# Patient Record
Sex: FEMALE | Race: WHITE | HISPANIC OR LATINO | Employment: FULL TIME | URBAN - METROPOLITAN AREA
[De-identification: names, ages, dates, MRNs, and addresses within clinical notes are randomized per-mention and may not be internally consistent; named-entity substitution may affect disease eponyms.]

---

## 2017-10-18 ENCOUNTER — OFFICE VISIT (OUTPATIENT)
Dept: MEDICAL GROUP | Facility: PHYSICIAN GROUP | Age: 32
End: 2017-10-18
Payer: COMMERCIAL

## 2017-10-18 VITALS
OXYGEN SATURATION: 95 % | HEART RATE: 74 BPM | HEIGHT: 67 IN | DIASTOLIC BLOOD PRESSURE: 78 MMHG | TEMPERATURE: 97.3 F | WEIGHT: 240 LBS | BODY MASS INDEX: 37.67 KG/M2 | SYSTOLIC BLOOD PRESSURE: 112 MMHG

## 2017-10-18 DIAGNOSIS — H61.22 IMPACTED CERUMEN OF LEFT EAR: ICD-10-CM

## 2017-10-18 PROCEDURE — 99213 OFFICE O/P EST LOW 20 MIN: CPT | Performed by: FAMILY MEDICINE

## 2017-10-18 ASSESSMENT — PAIN SCALES - GENERAL: PAINLEVEL: NO PAIN

## 2017-10-19 NOTE — ASSESSMENT & PLAN NOTE
Patient is here with complaint of decreased hearing in left ear.Reports having build up of wax every 2-3 years and having to have ear irrigation done.No pain, discharge, fever or chills.

## 2017-10-19 NOTE — PROGRESS NOTES
"Subjective:   Selena Yee is a 32 y.o. female here today for impacted cerumen left ear    Impacted cerumen of left ear  Patient is here with complaint of decreased hearing in left ear.Reports having build up of wax every 2-3 years and having to have ear irrigation done.No pain, discharge, fever or chills.       Current medicines (including changes today)  Current Outpatient Prescriptions   Medication Sig Dispense Refill   • ondansetron (ZOFRAN) 4 MG TABS tablet Take 1 Tab by mouth every 6 hours as needed for Nausea/Vomiting. 20 Tab 0   • drospirenone-ethinyl estradiol (MASSIMO) 3-0.03 MG per tablet TAKE 1 TABLET BY MOUTH EVERY DAY 84 Tab 3     No current facility-administered medications for this visit.      She  has a past medical history of Back pain (6/5/2009); Bilateral impacted cerumen (8/24/2015); DDD (degenerative disc disease), lumbar (6/5/2009); DUB (dysfunctional uterine bleeding) (6/5/2009); PCO (polycystic ovaries) (6/5/2009); and UTI (lower urinary tract infection) (8/24/2015).    ROS   No chest pain, no shortness of breath, no abdominal pain       Objective:     Blood pressure 112/78, pulse 74, temperature 36.3 °C (97.3 °F), height 1.702 m (5' 7\"), weight 108.9 kg (240 lb), SpO2 95 %. Body mass index is 37.59 kg/m².     PHYSICAL EXAM     GEN: Alert and oriented,well appearing, no acute distress  SKIN: warm, dry to touch, no rashes or lesions in visible areas  PSYCH: mood and affect normal, judgement normal  EYES: Conjunctiva clear, lids normal,pupils equal round and reactive  ENMT: Normal external nose and ears,Right EAC normal, clear, TM visible; Left EAC impacted with cerumen, TM not visible    Assessment and Plan:   The following treatment plan was discussed    1. Impacted cerumen of left ear  Ear irrigation performed by MA in the visit with copious amounts of cerumen removed.Patient tolerated procedure well.      Followup: Return if symptoms worsen or fail to improve.         Please note " that this dictation was created using voice recognition software. I have made every reasonable attempt to correct obvious errors, but I expect that there are errors of grammar and possibly content that I did not discover before finalizing the note.

## 2018-05-09 ENCOUNTER — OFFICE VISIT (OUTPATIENT)
Dept: MEDICAL GROUP | Facility: MEDICAL CENTER | Age: 33
End: 2018-05-09
Payer: COMMERCIAL

## 2018-05-09 VITALS
TEMPERATURE: 98.3 F | WEIGHT: 235 LBS | HEART RATE: 80 BPM | SYSTOLIC BLOOD PRESSURE: 110 MMHG | OXYGEN SATURATION: 96 % | RESPIRATION RATE: 16 BRPM | HEIGHT: 67 IN | DIASTOLIC BLOOD PRESSURE: 76 MMHG | BODY MASS INDEX: 36.88 KG/M2

## 2018-05-09 DIAGNOSIS — E66.9 OBESITY (BMI 30-39.9): ICD-10-CM

## 2018-05-09 DIAGNOSIS — H61.23 HEARING LOSS DUE TO CERUMEN IMPACTION, BILATERAL: ICD-10-CM

## 2018-05-09 PROCEDURE — 99213 OFFICE O/P EST LOW 20 MIN: CPT | Performed by: NURSE PRACTITIONER

## 2018-05-10 NOTE — PROGRESS NOTES
"Subjective:   Selena Yee is a 33 y.o. female here today for the following concern:     Hearing loss due to cerumen impaction, bilateral  Patient has been having increased left sided hearing loss and bilateral ear discomfort. Has a history of cerumen impaction and has not had her ears cleaned or looked at in quite some time. Denies fevers, chills, significant ear pain, or headache.        Current medicines (including changes today)  Current Outpatient Prescriptions   Medication Sig Dispense Refill   • ondansetron (ZOFRAN) 4 MG TABS tablet Take 1 Tab by mouth every 6 hours as needed for Nausea/Vomiting. 20 Tab 0   • drospirenone-ethinyl estradiol (MASSIMO) 3-0.03 MG per tablet TAKE 1 TABLET BY MOUTH EVERY DAY 84 Tab 3     No current facility-administered medications for this visit.      She  has a past medical history of Back pain (6/5/2009); Bilateral impacted cerumen (8/24/2015); DDD (degenerative disc disease), lumbar (6/5/2009); DUB (dysfunctional uterine bleeding) (6/5/2009); PCO (polycystic ovaries) (6/5/2009); and UTI (lower urinary tract infection) (8/24/2015).    ROS     No chest pain, no shortness of breath, no abdominal pain  Positive ROS as per HPI.  All other systems reviewed and are negative.     Objective:     Blood pressure 110/76, pulse 80, temperature 36.8 °C (98.3 °F), resp. rate 16, height 1.702 m (5' 7.01\"), weight 106.6 kg (235 lb), SpO2 96 %, not currently breastfeeding. Body mass index is 36.8 kg/m².     Physical Exam:  Constitutional: Alert, no distress.  Skin: Warm, dry, good turgor, no rashes in visible areas.  Eye: Equal, round, conjunctiva clear, lids normal.  ENMT: Lips without lesions, good dentition. +bilateral cerumen impaction, L>R.  Neck: Trachea midline  Respiratory: Unlabored respiratory effort  Cardiovascular: No edema.  Psych: Alert and oriented x3, normal affect and mood.        Assessment and Plan:   The following treatment plan was discussed    1. Hearing loss due " to cerumen impaction, bilateral  Unstable.   Bilateral ears lavaged by MA. Near complete removal of cerumen achieved. Advised avoiding qtips to clean ears. May use OTC wax softeners as needed to keep wax from building up. Patient verbalized understanding.       Followup: Return if symptoms worsen or fail to improve.    I have placed the below orders and discussed them with an approved delegating provider. The MA is performing the below orders under the direction of Dr. Christine

## 2018-05-10 NOTE — ASSESSMENT & PLAN NOTE
Patient has been having increased left sided hearing loss and bilateral ear discomfort. Has a history of cerumen impaction and has not had her ears cleaned or looked at in quite some time. Denies fevers, chills, significant ear pain, or headache.

## 2018-07-26 ENCOUNTER — OFFICE VISIT (OUTPATIENT)
Dept: MEDICAL GROUP | Facility: PHYSICIAN GROUP | Age: 33
End: 2018-07-26

## 2018-07-26 VITALS
HEART RATE: 67 BPM | TEMPERATURE: 98.9 F | OXYGEN SATURATION: 97 % | WEIGHT: 245 LBS | DIASTOLIC BLOOD PRESSURE: 60 MMHG | BODY MASS INDEX: 38.45 KG/M2 | HEIGHT: 67 IN | SYSTOLIC BLOOD PRESSURE: 110 MMHG

## 2018-07-26 DIAGNOSIS — F43.21 GRIEF REACTION: ICD-10-CM

## 2018-07-26 PROCEDURE — 99213 OFFICE O/P EST LOW 20 MIN: CPT | Performed by: NURSE PRACTITIONER

## 2018-07-26 ASSESSMENT — PATIENT HEALTH QUESTIONNAIRE - PHQ9
CLINICAL INTERPRETATION OF PHQ2 SCORE: 2
5. POOR APPETITE OR OVEREATING: 1 - SEVERAL DAYS
SUM OF ALL RESPONSES TO PHQ QUESTIONS 1-9: 7

## 2018-07-26 ASSESSMENT — ENCOUNTER SYMPTOMS
STRESS: 1
SHORTNESS OF BREATH: 0
DEPRESSION: 0

## 2018-07-26 NOTE — LETTER
July 26, 2018         Patient: Selena Yee   YOB: 1985   Date of Visit: 7/26/2018           To Whom it May Concern:    Selena Yee was seen in my clinic on 7/26/2018. Please allow her 2 dogs to reside with patient at current living facility as there are her  animals/    If you have any questions or concerns, please don't hesitate to call.        Sincerely,           ITALO Woodall.  Electronically Signed

## 2018-07-26 NOTE — PROGRESS NOTES
"Subjective:   Selena Yee is a 33 y.o. female here today for stress and requesting animal  letter.    Stress   Presents for initial visit. Onset was 6 to 12 months ago. The problem has been waxing and waning. Patient reports no chest pain, shortness of breath or suicidal ideas. Primary symptoms comment: Feeling down regarding infertility. States that her \"dogs are her children and needs a letter to allow them to live with her otherwise she will be evicted.\" . The severity of symptoms is mild.         Current medicines (including changes today)  Current Outpatient Prescriptions   Medication Sig Dispense Refill   • ondansetron (ZOFRAN) 4 MG TABS tablet Take 1 Tab by mouth every 6 hours as needed for Nausea/Vomiting. 20 Tab 0   • drospirenone-ethinyl estradiol (MASSIMO) 3-0.03 MG per tablet TAKE 1 TABLET BY MOUTH EVERY DAY 84 Tab 3     No current facility-administered medications for this visit.      She  has a past medical history of Back pain (6/5/2009); Bilateral impacted cerumen (8/24/2015); DDD (degenerative disc disease), lumbar (6/5/2009); DUB (dysfunctional uterine bleeding) (6/5/2009); PCO (polycystic ovaries) (6/5/2009); and UTI (lower urinary tract infection) (8/24/2015).    Social History     Social History   • Marital status: Single     Spouse name: N/A   • Number of children: N/A   • Years of education: N/A     Occupational History   • Not on file.     Social History Main Topics   • Smoking status: Never Smoker   • Smokeless tobacco: Never Used   • Alcohol use No      Comment: occ   • Drug use: No   • Sexual activity: Yes     Partners: Male     Other Topics Concern   • Not on file     Social History Narrative   • No narrative on file       Review of Systems   Respiratory: Negative for shortness of breath.    Cardiovascular: Negative for chest pain.   Psychiatric/Behavioral: Negative for depression and suicidal ideas.        Objective:     Blood pressure 110/60, pulse 67, temperature " "37.2 °C (98.9 °F), height 1.702 m (5' 7\"), weight 111.1 kg (245 lb), SpO2 97 %. Body mass index is 38.37 kg/m².     Physical Exam:  Constitutional: Oriented to person, place, and time and well-developed, well-nourished, and in no distress.  HENT:   Head: Normocephalic and atraumatic.   Eyes: Conjunctivae and EOM are normal. Pupils are equal, round, and reactive to light.   Neck: Normal range of motion. Neck supple. No thyromegaly present.   Cardiovascular: Normal rate, regular rhythm, normal heart sounds. Exam reveals no friction rub. No murmur heard.  Pulmonary/Chest: Effort normal and breath sounds normal. No respiratory distress or use of accessory muscles. No wheezes, rhonchi, or rales.   Musculoskeletal: Full range of motion.   Neurological: Alert and oriented to person, place, and time. Gait normal.   Skin: Skin is warm and dry. No cyanosis. No edema.  Psychiatric: Mood, memory, affect and judgment normal. She is tearful with discussing her current situation.      Assessment and Plan:   The following treatment plan was discussed    1. Grief reaction  Stable, controlled. Letter provided to allow dogs to reside at current living facility.  Return to clinic if her symptoms worsen including suicidal homicidal ideation.    Followup: Return if symptoms worsen or fail to improve.           "

## 2018-10-31 ENCOUNTER — OFFICE VISIT (OUTPATIENT)
Dept: URGENT CARE | Facility: CLINIC | Age: 33
End: 2018-10-31
Payer: COMMERCIAL

## 2018-10-31 VITALS
TEMPERATURE: 98.9 F | OXYGEN SATURATION: 98 % | DIASTOLIC BLOOD PRESSURE: 84 MMHG | HEART RATE: 72 BPM | SYSTOLIC BLOOD PRESSURE: 110 MMHG | HEIGHT: 67 IN | BODY MASS INDEX: 39.15 KG/M2 | WEIGHT: 249.4 LBS

## 2018-10-31 DIAGNOSIS — H61.23 BILATERAL IMPACTED CERUMEN: ICD-10-CM

## 2018-10-31 PROCEDURE — 99213 OFFICE O/P EST LOW 20 MIN: CPT | Performed by: PHYSICIAN ASSISTANT

## 2018-10-31 ASSESSMENT — ENCOUNTER SYMPTOMS
VOMITING: 0
CHILLS: 0
COUGH: 0
DIARRHEA: 0
DIZZINESS: 0
ABDOMINAL PAIN: 0
SORE THROAT: 0
MUSCULOSKELETAL NEGATIVE: 1
SPUTUM PRODUCTION: 0
SHORTNESS OF BREATH: 0
FEVER: 0
NAUSEA: 0

## 2018-11-01 NOTE — PROGRESS NOTES
"Subjective:      Selena Yee is a 33 y.o. female who presents with Cerumen Impaction (Right is worse than the left - ongoing issue)            Cerumen Impaction   This is a recurrent problem. The current episode started in the past 7 days. The problem occurs constantly. The problem has been gradually worsening. Pertinent negatives include no abdominal pain, chest pain, chills, congestion, coughing, fever, nausea, sore throat or vomiting. Nothing aggravates the symptoms. She has tried nothing for the symptoms.       Review of Systems   Constitutional: Negative for chills and fever.   HENT: Positive for hearing loss. Negative for congestion, ear discharge, ear pain, sore throat and tinnitus.    Respiratory: Negative for cough, sputum production and shortness of breath.    Cardiovascular: Negative for chest pain.   Gastrointestinal: Negative for abdominal pain, diarrhea, nausea and vomiting.   Genitourinary: Negative.    Musculoskeletal: Negative.    Neurological: Negative for dizziness.        Objective:     /84 (BP Location: Right arm, Patient Position: Sitting, BP Cuff Size: Adult long)   Pulse 72   Temp 37.2 °C (98.9 °F) (Temporal)   Ht 1.702 m (5' 7\")   Wt 113.1 kg (249 lb 6.4 oz)   SpO2 98%   BMI 39.06 kg/m²      Physical Exam   Constitutional: She is oriented to person, place, and time. She appears well-developed and well-nourished. No distress.   HENT:   Head: Normocephalic and atraumatic.   Right Ear: Hearing, tympanic membrane, external ear and ear canal normal.   Left Ear: Hearing, tympanic membrane, external ear and ear canal normal.   Mouth/Throat: Oropharynx is clear and moist.   Significant amount of cerumen present in bilateral ear canals, R>L. TMs visualized after successful ear lavage and appear normal.    Eyes: Pupils are equal, round, and reactive to light. Conjunctivae are normal.   Neck: Normal range of motion.   Cardiovascular: Normal rate.    Pulmonary/Chest: Effort " normal.   Musculoskeletal: Normal range of motion.   Neurological: She is alert and oriented to person, place, and time.   Skin: Skin is warm and dry. She is not diaphoretic.   Psychiatric: She has a normal mood and affect. Her behavior is normal.   Nursing note and vitals reviewed.          PMH:  has a past medical history of Back pain (6/5/2009); Bilateral impacted cerumen (8/24/2015); DDD (degenerative disc disease), lumbar (6/5/2009); DUB (dysfunctional uterine bleeding) (6/5/2009); PCO (polycystic ovaries) (6/5/2009); and UTI (lower urinary tract infection) (8/24/2015).  MEDS:   Current Outpatient Prescriptions:   •  ondansetron (ZOFRAN) 4 MG TABS tablet, Take 1 Tab by mouth every 6 hours as needed for Nausea/Vomiting., Disp: 20 Tab, Rfl: 0  •  drospirenone-ethinyl estradiol (MASSIMO) 3-0.03 MG per tablet, TAKE 1 TABLET BY MOUTH EVERY DAY (Patient not taking: Reported on 10/31/2018), Disp: 84 Tab, Rfl: 3  ALLERGIES: No Known Allergies  SURGHX: History reviewed. No pertinent surgical history.  SOCHX:  reports that she has never smoked. She has never used smokeless tobacco. She reports that she does not drink alcohol or use drugs.  FH: family history includes Diabetes in her mother; Hypertension in her father.    Assessment/Plan:     1. Bilateral impacted cerumen    Ear lavage performed bilaterally with significant amount of cerumen removed from both ear canals. TMs visualized afterwards and appear normal. Patient reports resolution of hearing loss after cerumen removal. Advised patient to soak equal parts water and hydrogen peroxide for 5-10 minutes every 1-2 weeks for prevention of wax buildup in the future.

## 2019-04-20 ENCOUNTER — OFFICE VISIT (OUTPATIENT)
Dept: URGENT CARE | Facility: PHYSICIAN GROUP | Age: 34
End: 2019-04-20
Payer: COMMERCIAL

## 2019-04-20 VITALS
OXYGEN SATURATION: 97 % | BODY MASS INDEX: 36.1 KG/M2 | HEART RATE: 94 BPM | DIASTOLIC BLOOD PRESSURE: 84 MMHG | TEMPERATURE: 98.1 F | HEIGHT: 67 IN | SYSTOLIC BLOOD PRESSURE: 146 MMHG | WEIGHT: 230 LBS

## 2019-04-20 DIAGNOSIS — J02.9 SORE THROAT: ICD-10-CM

## 2019-04-20 DIAGNOSIS — J02.0 STREP THROAT: ICD-10-CM

## 2019-04-20 LAB
INT CON NEG: NEGATIVE
INT CON POS: POSITIVE
S PYO AG THROAT QL: POSITIVE

## 2019-04-20 PROCEDURE — 99214 OFFICE O/P EST MOD 30 MIN: CPT | Performed by: NURSE PRACTITIONER

## 2019-04-20 PROCEDURE — 87880 STREP A ASSAY W/OPTIC: CPT | Performed by: NURSE PRACTITIONER

## 2019-04-20 RX ORDER — GUAIFENESIN 600 MG/1
600 TABLET, EXTENDED RELEASE ORAL EVERY 12 HOURS
COMMUNITY
End: 2021-07-15

## 2019-04-20 RX ORDER — AMOXICILLIN 875 MG/1
875 TABLET, COATED ORAL 2 TIMES DAILY
Qty: 20 TAB | Refills: 0 | Status: SHIPPED | OUTPATIENT
Start: 2019-04-20 | End: 2021-08-05

## 2019-04-20 ASSESSMENT — ENCOUNTER SYMPTOMS
FEVER: 0
MYALGIAS: 0
CHILLS: 0
DIARRHEA: 0
SHORTNESS OF BREATH: 0
WHEEZING: 0
COUGH: 1
HEADACHES: 1
ORTHOPNEA: 0
SORE THROAT: 1
EYE DISCHARGE: 0
SPUTUM PRODUCTION: 0
NAUSEA: 0

## 2019-04-20 NOTE — PROGRESS NOTES
Subjective:      Selena Yee is a 34 y.o. female who presents with Pharyngitis (x5 days. Sore throat, headache, ear pain.)            HPI new. 34 year old female with congestion, sore throat, body aches and ear pain as well as headache for 5 days. She denies any fever, chills, nausea or diarrhea. She has no rashes or eye symptoms with this illness. Taking otc cough and cold medications. No flu shot this year.  Patient has no known allergies.  Current Outpatient Prescriptions on File Prior to Visit   Medication Sig Dispense Refill   • ondansetron (ZOFRAN) 4 MG TABS tablet Take 1 Tab by mouth every 6 hours as needed for Nausea/Vomiting. (Patient not taking: Reported on 4/20/2019) 20 Tab 0   • drospirenone-ethinyl estradiol (MASSIMO) 3-0.03 MG per tablet TAKE 1 TABLET BY MOUTH EVERY DAY (Patient not taking: Reported on 10/31/2018) 84 Tab 3     No current facility-administered medications on file prior to visit.      Social History     Social History   • Marital status: Single     Spouse name: N/A   • Number of children: N/A   • Years of education: N/A     Occupational History   • Not on file.     Social History Main Topics   • Smoking status: Never Smoker   • Smokeless tobacco: Never Used   • Alcohol use No   • Drug use: No   • Sexual activity: Yes     Partners: Male     Other Topics Concern   • Not on file     Social History Narrative   • No narrative on file     family history includes Diabetes in her mother; Hypertension in her father.      Review of Systems   Constitutional: Positive for malaise/fatigue. Negative for chills and fever.   HENT: Positive for congestion and sore throat.    Eyes: Negative for discharge.   Respiratory: Positive for cough. Negative for sputum production, shortness of breath and wheezing.    Cardiovascular: Negative for chest pain and orthopnea.   Gastrointestinal: Negative for diarrhea and nausea.   Musculoskeletal: Negative for myalgias.   Neurological: Positive for headaches.  "  Endo/Heme/Allergies: Negative for environmental allergies.          Objective:     /84   Pulse 94   Temp 36.7 °C (98.1 °F)   Ht 1.702 m (5' 7\")   Wt 104.3 kg (230 lb)   SpO2 97%   BMI 36.02 kg/m²      Physical Exam   Constitutional: She is oriented to person, place, and time. She appears well-developed and well-nourished. No distress.   HENT:   Head: Normocephalic and atraumatic.   Right Ear: External ear and ear canal normal. Tympanic membrane is not injected and not perforated. No middle ear effusion.   Left Ear: External ear and ear canal normal. Tympanic membrane is not injected and not perforated.  No middle ear effusion.   Nose: Mucosal edema present.   Mouth/Throat: Posterior oropharyngeal erythema present. No oropharyngeal exudate.   Eyes: Conjunctivae are normal. Right eye exhibits no discharge. Left eye exhibits no discharge.   Neck: Normal range of motion. Neck supple.   Cardiovascular: Normal rate, regular rhythm and normal heart sounds.    No murmur heard.  Pulmonary/Chest: Effort normal and breath sounds normal. No respiratory distress.   Musculoskeletal: Normal range of motion.   Normal movement of all 4 extremities.   Lymphadenopathy:     She has no cervical adenopathy.        Right: No supraclavicular adenopathy present.        Left: No supraclavicular adenopathy present.   Neurological: She is alert and oriented to person, place, and time. Gait normal.   Skin: Skin is warm and dry.   Psychiatric: She has a normal mood and affect. Her behavior is normal. Thought content normal.   Nursing note and vitals reviewed.              Assessment/Plan:     1. Sore throat  POCT Rapid Strep A   2. Strep throat  amoxicillin (AMOXIL) 875 MG tablet     Positive strep.  Amoxicillin/change toothbrush in 48 hours.  May continue OTC medications as directed.  Differential diagnosis, natural history, supportive care, and indications for immediate follow-up discussed at length.     "

## 2019-12-30 ENCOUNTER — OFFICE VISIT (OUTPATIENT)
Dept: URGENT CARE | Facility: PHYSICIAN GROUP | Age: 34
End: 2019-12-30
Payer: COMMERCIAL

## 2019-12-30 ENCOUNTER — HOSPITAL ENCOUNTER (OUTPATIENT)
Dept: RADIOLOGY | Facility: MEDICAL CENTER | Age: 34
End: 2019-12-30
Attending: FAMILY MEDICINE
Payer: COMMERCIAL

## 2019-12-30 VITALS
TEMPERATURE: 97.9 F | WEIGHT: 261 LBS | BODY MASS INDEX: 40.97 KG/M2 | OXYGEN SATURATION: 98 % | HEIGHT: 67 IN | SYSTOLIC BLOOD PRESSURE: 112 MMHG | RESPIRATION RATE: 16 BRPM | DIASTOLIC BLOOD PRESSURE: 84 MMHG | HEART RATE: 67 BPM

## 2019-12-30 DIAGNOSIS — R10.84 GENERALIZED ABDOMINAL PAIN: ICD-10-CM

## 2019-12-30 PROCEDURE — 99213 OFFICE O/P EST LOW 20 MIN: CPT | Performed by: FAMILY MEDICINE

## 2019-12-31 ASSESSMENT — ENCOUNTER SYMPTOMS
VOMITING: 0
CONSTIPATION: 0
DIARRHEA: 0
NAUSEA: 0

## 2020-01-01 NOTE — PROGRESS NOTES
"Subjective:   Selena Yee is a 34 y.o. female who presents for RLQ Pain (exacerbates with ETOH )     RLQ Pain   This is a new problem. The current episode started 1 to 4 weeks ago. The onset quality is undetermined. The problem occurs constantly. The problem has been waxing and waning. The pain is located in the right flank. The pain is moderate. The quality of the pain is aching, cramping and tearing. Pertinent negatives include no constipation, diarrhea, nausea or vomiting.     Review of Systems   Gastrointestinal: Negative for constipation, diarrhea, nausea and vomiting.      Objective:   /84 (BP Location: Left arm, Patient Position: Sitting, BP Cuff Size: Adult)   Pulse 67   Temp 36.6 °C (97.9 °F) (Temporal)   Resp 16   Ht 1.702 m (5' 7\")   Wt 118.4 kg (261 lb)   SpO2 98%   BMI 40.88 kg/m²   Physical Exam  Constitutional:       General: She is not in acute distress.     Appearance: She is well-developed.   HENT:      Head: Normocephalic and atraumatic.   Eyes:      Conjunctiva/sclera: Conjunctivae normal.      Pupils: Pupils are equal, round, and reactive to light.   Cardiovascular:      Rate and Rhythm: Normal rate and regular rhythm.      Heart sounds: No murmur.   Pulmonary:      Effort: Pulmonary effort is normal. No respiratory distress.      Breath sounds: Normal breath sounds.   Abdominal:      General: There is no distension.      Palpations: Abdomen is soft.      Tenderness: There is no tenderness. There is no right CVA tenderness, left CVA tenderness, guarding or rebound. Negative signs include Kessler's sign, Rovsing's sign, McBurney's sign, psoas sign and obturator sign.       Skin:     General: Skin is warm and dry.   Neurological:      Mental Status: She is alert and oriented to person, place, and time.      Sensory: No sensory deficit.      Deep Tendon Reflexes: Reflexes are normal and symmetric.          Assessment/Plan:   1. Generalized abdominal pain  - [id number] " VIEW; Future  - POCT Pregnancy  Likely muscle strain 2/2 retching. Use over-the-counter pain reliever, such as acetaminophen (Tylenol), ibuprofen (Advil, Motrin) or naproxen (Aleve) as needed; follow package directions for dosing.   Differential diagnosis, natural history, supportive care, and indications for immediate follow-up discussed.

## 2021-07-13 ENCOUNTER — TELEPHONE (OUTPATIENT)
Dept: MEDICAL GROUP | Facility: PHYSICIAN GROUP | Age: 36
End: 2021-07-13

## 2021-07-13 NOTE — TELEPHONE ENCOUNTER
Future Appointments       Provider Department Center    7/15/2021 2:30 PM BROOKE Valiente [facility] VIS        NEW PATIENT VISIT PRE-VISIT PLANNING    1.  [employer] Patient is checked in Patient Demographics?Yes    2.  Immunizations were updated in Epic using Reconcile Outside Information activity? Yes         3.  Is this appointment scheduled as a Hospital Follow-Up? No    4.  Patient is due for the following Health Maintenance Topics:   Health Maintenance Due   Topic Date Due   • IMM DTaP/Tdap/Td Vaccine (6 - Tdap) 03/20/1998   • PAP SMEAR  06/02/2017   • COVID-19 Vaccine (2 - Moderna 2-dose series) 06/02/2021     5.  Reviewed/Updated the following with patient:       •   Preferred Pharmacy? Yes       •   Preferred Lab? Yes       •   Preferred Communication? Yes       •   Allergies? Yes       •   Medications? YES. Was Abstract Encounter opened and chart updated? YES       •   Social History? Yes       •   Family History (document living status of immediate family members and if + hx of  cancer, diabetes, hypertension, hyperlipidemia, heart attack, stroke) Yes    6.  Updated Care Team?       •   DME Company (gait device, O2, CPAP, etc.) NO       •   Other Specialists (eye doctor, derm, GYN, cardiology, endo, etc): NO    7.  AHA (Puls8) form printed for Provider? N/A   Patient advised to arrive 15 minutes prior to scheduled appointment

## 2021-07-15 ENCOUNTER — OFFICE VISIT (OUTPATIENT)
Dept: MEDICAL GROUP | Facility: PHYSICIAN GROUP | Age: 36
End: 2021-07-15
Payer: COMMERCIAL

## 2021-07-15 VITALS
WEIGHT: 269 LBS | SYSTOLIC BLOOD PRESSURE: 110 MMHG | RESPIRATION RATE: 16 BRPM | HEIGHT: 67 IN | DIASTOLIC BLOOD PRESSURE: 70 MMHG | OXYGEN SATURATION: 96 % | BODY MASS INDEX: 42.22 KG/M2 | TEMPERATURE: 98.2 F | HEART RATE: 80 BPM

## 2021-07-15 DIAGNOSIS — N93.8 DUB (DYSFUNCTIONAL UTERINE BLEEDING): ICD-10-CM

## 2021-07-15 DIAGNOSIS — M51.36 DDD (DEGENERATIVE DISC DISEASE), LUMBAR: ICD-10-CM

## 2021-07-15 DIAGNOSIS — E28.2 POLYCYSTIC OVARIES: ICD-10-CM

## 2021-07-15 DIAGNOSIS — E66.01 MORBID OBESITY (HCC): ICD-10-CM

## 2021-07-15 PROBLEM — H61.23 HEARING LOSS DUE TO CERUMEN IMPACTION, BILATERAL: Status: RESOLVED | Noted: 2018-05-09 | Resolved: 2021-07-15

## 2021-07-15 PROBLEM — F43.21 SITUATIONAL DEPRESSION: Status: RESOLVED | Noted: 2018-07-26 | Resolved: 2021-07-15

## 2021-07-15 PROCEDURE — 99214 OFFICE O/P EST MOD 30 MIN: CPT | Performed by: NURSE PRACTITIONER

## 2021-07-15 ASSESSMENT — PATIENT HEALTH QUESTIONNAIRE - PHQ9: CLINICAL INTERPRETATION OF PHQ2 SCORE: 0

## 2021-07-15 NOTE — ASSESSMENT & PLAN NOTE
BMI at 42.13.  Has not been exercising.  Recently started walking 30 minutes daily.  Walking dogs.

## 2021-07-15 NOTE — PROGRESS NOTES
Selena Yee is a 36 y.o. female here today to establish care and for evaluation and management of:    HPI  Morbid obesity (HCC)  BMI at 42.13.  Has not been exercising.  Recently started walking 30 minutes daily.  Walking dogs.     DDD (Degenerative Disc Disease), Lumbar  2009 MRI  MULTILEVEL DEGENERATIVE DISC DISEASE AND FACET DEGENERATION WHICH RESULTS   IN NO SIGNIFICANT CENTRAL CANAL NARROWING.  THERE IS MINIMAL NEURAL   FORAMINAL NARROWING AT LEVELS AS SPECIFICALLY DESCRIBED ABOVE.      DUB (dysfunctional uterine bleeding)  Reports past history of irregular periods, was on oral birth control pills that regulated periods.  Started 2-3 years ago, went to OB and then to fertility clinic and had shots, clomid no pregnancy.  Then reports that her periods are about three times/year.  Has noted in the past 3 months is having some ongoing spotting, occasional cramping. Wearing panty liners.  No heavy bleeding reported.     PCO (polycystic ovaries)  Has done one round of fertility treatments, buthaving irregular periods and would like to have a pregnancy. Reports hair on nipples and some on face.  This seems to be worsening.        Current medicines (including changes today)  Current Outpatient Medications   Medication Sig Dispense Refill   • amoxicillin (AMOXIL) 875 MG tablet Take 1 Tab by mouth 2 times a day. (Patient not taking: Reported on 12/30/2019) 20 Tab 0     No current facility-administered medications for this visit.       She  has a past medical history of Anemia, Back pain (6/5/2009), Bilateral impacted cerumen (8/24/2015), DDD (degenerative disc disease), lumbar (6/5/2009), DUB (dysfunctional uterine bleeding) (6/5/2009), PCO (polycystic ovaries) (6/5/2009), and UTI (lower urinary tract infection) (8/24/2015).    She  has no past surgical history on file.    Social History     Tobacco Use   • Smoking status: Never Smoker   • Smokeless tobacco: Never Used   Vaping Use   • Vaping Use: Never used  "  Substance Use Topics   • Alcohol use: Yes     Comment: once every other week 3-4 cocktails   • Drug use: No       Social History     Social History Narrative   • Not on file       Family History   Problem Relation Age of Onset   • Hypertension Father    • Diabetes Father    • Diabetes Mother        Family Status   Relation Name Status   • Fa  Alive   • Mo  Alive   • Sis  Alive   • Bro  Alive   • MGMo  Alive   • MGFa  Alive   • PGMo     • PGFa           ROS  As stated in hpi  All other systems reviewed and are negative     Objective:     /70 (BP Location: Left arm, Patient Position: Sitting)   Pulse 80   Temp 36.8 °C (98.2 °F) (Temporal)   Resp 16   Ht 1.702 m (5' 7\")   Wt 122 kg (269 lb)   SpO2 96%  Body mass index is 42.13 kg/m².  Physical Exam:    Constitutional: Alert, no distress.  Skin: Warm, dry, good turgor, no rashes in visible areas.  Eye: Equal, round and reactive, conjunctiva clear, lids normal..  Neck: Trachea midline, no masses, no thyromegaly. No cervical or supraclavicular lymphadenopathy.  Respiratory: Unlabored respiratory effort,  Psych: Alert and oriented x3, normal affect and mood.        Assessment and Plan:   The following treatment plan was discussed    1. Morbid obesity (HCC)  Chronic, ongoing.  Discussed exercise goal 150 min/week.  Encouraged portion control.  Monitor and follow.     2. DDD (degenerative disc disease), lumbar  Chronic, historical, stable at this time.  See #1 as a contributing factor.  Encouraging daily exercise.  Monitor.     3. DUB (dysfunctional uterine bleeding)  Chronic, ongoing, worsening.  High suspicion for PCOS.  Will order labs.  Possible referral to GYN.  May start metformin and spiranolactone.  Monitor and follow. Return in 3-4 weeks for weel woman.   - CBC WITH DIFFERENTIAL; Future  - Comp Metabolic Panel; Future  - HEMOGLOBIN A1C; Future  - Lipid Profile; Future  - TSH; Future  - FREE THYROXINE; Future  - VITAMIN D,25 HYDROXY; " Future  - ESTRADIOL; Future  - FSH/LH; Future  - TESTOSTERONE F&T FEMALES/CHILD; Future    4. PCO (polycystic ovaries)  See #3  High suspicioun due to androgen type symptoms.  Will follow labs.  Possible referral to GYN  Monitor and follow.       Records requested.  Followup: Return in about 4 weeks (around 8/12/2021) for well woman, Labs.         Educated in proper administration of medication(s) ordered today including safety, possible SE, risks, benefits, rationale and alternatives to therapy.     Please note that this dictation was created using voice recognition software. I have made every reasonable attempt to correct obvious errors, but I expect that there are errors of grammar and possibly content that I did not discover before finalizing the note.

## 2021-07-15 NOTE — ASSESSMENT & PLAN NOTE
2009 MRI  MULTILEVEL DEGENERATIVE DISC DISEASE AND FACET DEGENERATION WHICH RESULTS   IN NO SIGNIFICANT CENTRAL CANAL NARROWING.  THERE IS MINIMAL NEURAL   FORAMINAL NARROWING AT LEVELS AS SPECIFICALLY DESCRIBED ABOVE.

## 2021-07-15 NOTE — ASSESSMENT & PLAN NOTE
Has done one round of fertility treatments, buthaving irregular periods and would like to have a pregnancy. Reports hair on nipples and some on face.  This seems to be worsening.

## 2021-07-15 NOTE — ASSESSMENT & PLAN NOTE
Reports past history of irregular periods, was on oral birth control pills that regulated periods.  Started 2-3 years ago, went to OB and then to fertility clinic and had shots, clomid no pregnancy.  Then reports that her periods are about three times/year.  Has noted in the past 3 months is having some ongoing spotting, occasional cramping. Wearing panty liners.  No heavy bleeding reported.

## 2021-07-17 ENCOUNTER — HOSPITAL ENCOUNTER (OUTPATIENT)
Dept: LAB | Facility: MEDICAL CENTER | Age: 36
End: 2021-07-17
Attending: NURSE PRACTITIONER
Payer: COMMERCIAL

## 2021-07-17 DIAGNOSIS — N93.8 DUB (DYSFUNCTIONAL UTERINE BLEEDING): ICD-10-CM

## 2021-07-17 LAB
25(OH)D3 SERPL-MCNC: 20 NG/ML (ref 30–100)
ALBUMIN SERPL BCP-MCNC: 4.3 G/DL (ref 3.2–4.9)
ALBUMIN/GLOB SERPL: 1.5 G/DL
ALP SERPL-CCNC: 86 U/L (ref 30–99)
ALT SERPL-CCNC: 45 U/L (ref 2–50)
ANION GAP SERPL CALC-SCNC: 13 MMOL/L (ref 7–16)
AST SERPL-CCNC: 29 U/L (ref 12–45)
BASOPHILS # BLD AUTO: 0.7 % (ref 0–1.8)
BASOPHILS # BLD: 0.06 K/UL (ref 0–0.12)
BILIRUB SERPL-MCNC: 0.4 MG/DL (ref 0.1–1.5)
BUN SERPL-MCNC: 12 MG/DL (ref 8–22)
CALCIUM SERPL-MCNC: 8.9 MG/DL (ref 8.5–10.5)
CHLORIDE SERPL-SCNC: 104 MMOL/L (ref 96–112)
CHOLEST SERPL-MCNC: 186 MG/DL (ref 100–199)
CO2 SERPL-SCNC: 23 MMOL/L (ref 20–33)
CREAT SERPL-MCNC: 0.72 MG/DL (ref 0.5–1.4)
EOSINOPHIL # BLD AUTO: 0.1 K/UL (ref 0–0.51)
EOSINOPHIL NFR BLD: 1.1 % (ref 0–6.9)
ERYTHROCYTE [DISTWIDTH] IN BLOOD BY AUTOMATED COUNT: 41.5 FL (ref 35.9–50)
EST. AVERAGE GLUCOSE BLD GHB EST-MCNC: 114 MG/DL
FASTING STATUS PATIENT QL REPORTED: NORMAL
FSH SERPL-ACNC: 3.1 MIU/ML
GLOBULIN SER CALC-MCNC: 2.8 G/DL (ref 1.9–3.5)
GLUCOSE SERPL-MCNC: 86 MG/DL (ref 65–99)
HBA1C MFR BLD: 5.6 % (ref 4–5.6)
HCT VFR BLD AUTO: 42.5 % (ref 37–47)
HDLC SERPL-MCNC: 32 MG/DL
HGB BLD-MCNC: 14.1 G/DL (ref 12–16)
IMM GRANULOCYTES # BLD AUTO: 0.03 K/UL (ref 0–0.11)
IMM GRANULOCYTES NFR BLD AUTO: 0.3 % (ref 0–0.9)
LDLC SERPL CALC-MCNC: 120 MG/DL
LH SERPL-ACNC: 4.6 IU/L
LYMPHOCYTES # BLD AUTO: 2.61 K/UL (ref 1–4.8)
LYMPHOCYTES NFR BLD: 29.9 % (ref 22–41)
MCH RBC QN AUTO: 29.5 PG (ref 27–33)
MCHC RBC AUTO-ENTMCNC: 33.2 G/DL (ref 33.6–35)
MCV RBC AUTO: 88.9 FL (ref 81.4–97.8)
MONOCYTES # BLD AUTO: 0.42 K/UL (ref 0–0.85)
MONOCYTES NFR BLD AUTO: 4.8 % (ref 0–13.4)
NEUTROPHILS # BLD AUTO: 5.5 K/UL (ref 2–7.15)
NEUTROPHILS NFR BLD: 63.2 % (ref 44–72)
NRBC # BLD AUTO: 0 K/UL
NRBC BLD-RTO: 0 /100 WBC
PLATELET # BLD AUTO: 282 K/UL (ref 164–446)
PMV BLD AUTO: 10.9 FL (ref 9–12.9)
POTASSIUM SERPL-SCNC: 4.3 MMOL/L (ref 3.6–5.5)
PROT SERPL-MCNC: 7.1 G/DL (ref 6–8.2)
RBC # BLD AUTO: 4.78 M/UL (ref 4.2–5.4)
SODIUM SERPL-SCNC: 140 MMOL/L (ref 135–145)
T4 FREE SERPL-MCNC: 1.29 NG/DL (ref 0.93–1.7)
TRIGL SERPL-MCNC: 172 MG/DL (ref 0–149)
TSH SERPL DL<=0.005 MIU/L-ACNC: 2 UIU/ML (ref 0.38–5.33)
WBC # BLD AUTO: 8.7 K/UL (ref 4.8–10.8)

## 2021-07-17 PROCEDURE — 80061 LIPID PANEL: CPT

## 2021-07-17 PROCEDURE — 83036 HEMOGLOBIN GLYCOSYLATED A1C: CPT

## 2021-07-17 PROCEDURE — 83001 ASSAY OF GONADOTROPIN (FSH): CPT

## 2021-07-17 PROCEDURE — 84443 ASSAY THYROID STIM HORMONE: CPT

## 2021-07-17 PROCEDURE — 36415 COLL VENOUS BLD VENIPUNCTURE: CPT

## 2021-07-17 PROCEDURE — 84402 ASSAY OF FREE TESTOSTERONE: CPT

## 2021-07-17 PROCEDURE — 80053 COMPREHEN METABOLIC PANEL: CPT

## 2021-07-17 PROCEDURE — 82306 VITAMIN D 25 HYDROXY: CPT

## 2021-07-17 PROCEDURE — 85025 COMPLETE CBC W/AUTO DIFF WBC: CPT

## 2021-07-17 PROCEDURE — 83002 ASSAY OF GONADOTROPIN (LH): CPT

## 2021-07-17 PROCEDURE — 82670 ASSAY OF TOTAL ESTRADIOL: CPT

## 2021-07-17 PROCEDURE — 84403 ASSAY OF TOTAL TESTOSTERONE: CPT

## 2021-07-17 PROCEDURE — 84439 ASSAY OF FREE THYROXINE: CPT

## 2021-07-17 PROCEDURE — 84270 ASSAY OF SEX HORMONE GLOBUL: CPT

## 2021-07-19 LAB — ESTRADIOL SERPL-MCNC: 59 PG/ML

## 2021-07-21 LAB
SHBG SERPL-SCNC: 24 NMOL/L (ref 30–135)
TESTOST FREE SERPL-MCNC: 11.2 PG/ML (ref 1.3–9.2)
TESTOST SERPL-MCNC: 57 NG/DL (ref 9–55)

## 2021-08-05 ENCOUNTER — OFFICE VISIT (OUTPATIENT)
Dept: MEDICAL GROUP | Facility: PHYSICIAN GROUP | Age: 36
End: 2021-08-05
Payer: COMMERCIAL

## 2021-08-05 ENCOUNTER — HOSPITAL ENCOUNTER (OUTPATIENT)
Facility: MEDICAL CENTER | Age: 36
End: 2021-08-05
Attending: NURSE PRACTITIONER
Payer: COMMERCIAL

## 2021-08-05 VITALS
DIASTOLIC BLOOD PRESSURE: 80 MMHG | BODY MASS INDEX: 43 KG/M2 | TEMPERATURE: 97.7 F | OXYGEN SATURATION: 97 % | HEART RATE: 80 BPM | HEIGHT: 67 IN | SYSTOLIC BLOOD PRESSURE: 112 MMHG | WEIGHT: 274 LBS | RESPIRATION RATE: 16 BRPM

## 2021-08-05 DIAGNOSIS — Z01.419 WELL WOMAN EXAM WITH ROUTINE GYNECOLOGICAL EXAM: ICD-10-CM

## 2021-08-05 DIAGNOSIS — E28.2 POLYCYSTIC OVARIES: ICD-10-CM

## 2021-08-05 DIAGNOSIS — E78.2 MIXED HYPERLIPIDEMIA: ICD-10-CM

## 2021-08-05 DIAGNOSIS — Z12.4 SCREENING FOR CERVICAL CANCER: ICD-10-CM

## 2021-08-05 DIAGNOSIS — E55.9 VITAMIN D DEFICIENCY: ICD-10-CM

## 2021-08-05 PROBLEM — E78.1 PURE HYPERTRIGLYCERIDEMIA: Status: ACTIVE | Noted: 2021-08-05

## 2021-08-05 PROCEDURE — 87591 N.GONORRHOEAE DNA AMP PROB: CPT

## 2021-08-05 PROCEDURE — 88175 CYTOPATH C/V AUTO FLUID REDO: CPT

## 2021-08-05 PROCEDURE — 87624 HPV HI-RISK TYP POOLED RSLT: CPT

## 2021-08-05 PROCEDURE — 87491 CHLMYD TRACH DNA AMP PROBE: CPT

## 2021-08-05 PROCEDURE — 99395 PREV VISIT EST AGE 18-39: CPT | Performed by: NURSE PRACTITIONER

## 2021-08-05 PROCEDURE — 99000 SPECIMEN HANDLING OFFICE-LAB: CPT | Performed by: NURSE PRACTITIONER

## 2021-08-05 ASSESSMENT — FIBROSIS 4 INDEX: FIB4 SCORE: 0.52

## 2021-08-05 NOTE — ASSESSMENT & PLAN NOTE
Results for LOU TOMAS (MRN [id number]) as of 8/5/2021 15:06   Ref. Range 7/17/2021 08:33   Triglycerides Latest Ref Range: 0 - 149 mg/dL 172 (H)   HDL Latest Ref Range: >=40 mg/dL 32 (A)   LDL Latest Ref Range: <100 mg/dL 120 (H)

## 2021-08-05 NOTE — PROGRESS NOTES
Subjective:     CC:   Chief Complaint   Patient presents with   • Gynecologic Exam   • Lab Results       HPI:   Selena Yee is a 36 y.o. female who presents for annual exam    Patient has GYN provider: No   Last Pap Smear: due today   H/O Abnormal Pap: No  Last Mammogram: na  Last Bone Density Test: na  Last Colorectal Cancer Screening: na  Last Tdap: na  Received HPV series: No    Exercise: sporadic irregular exercise, <half hour walking weekly  Diet: BMI 42.9      No LMP recorded. (Menstrual status: Irregular Menses).  Hx STDs: No  Birth control:none  Mensesirregular,   Denies cramping.  No significant bloating/fluid retention, pelvic pain, or dyspareunia. No abnormal vaginal discharge.  No breast tenderness, mass, nipple discharge, changes in size or contour, or abnormal cyclic discomfort.    OB History    Para Term  AB Living   0 0 0 0 0 0   SAB TAB Ectopic Molar Multiple Live Births   0 0 0 0 0 0      She  reports being sexually active and has had partner(s) who are Male.    She  has a past medical history of Anemia, Back pain (2009), Bilateral impacted cerumen (2015), DDD (degenerative disc disease), lumbar (2009), DUB (dysfunctional uterine bleeding) (2009), PCO (polycystic ovaries) (2009), and UTI (lower urinary tract infection) (2015).  She  has no past surgical history on file.    Family History   Problem Relation Age of Onset   • Hypertension Father    • Diabetes Father    • Diabetes Mother      Social History     Tobacco Use   • Smoking status: Never Smoker   • Smokeless tobacco: Never Used   Vaping Use   • Vaping Use: Never used   Substance Use Topics   • Alcohol use: Yes     Comment: once every other week 3-4 cocktails   • Drug use: No       Patient Active Problem List    Diagnosis Date Noted   • Well woman exam with routine gynecological exam 2021   • Vitamin D deficiency 2021   • Mixed hyperlipidemia 2021   • Morbid obesity (HCC)  "05/09/2018   • DDD (degenerative disc disease), lumbar 06/05/2009   • PCO (polycystic ovaries) 06/05/2009   • DUB (dysfunctional uterine bleeding) 06/05/2009     No current outpatient medications on file.     No current facility-administered medications for this visit.     No Known Allergies    Review of Systems   Constitutional: Negative for fever, chills and malaise/fatigue.   HENT: Negative for congestion.    Eyes: Negative for pain.   Respiratory: Negative for cough and shortness of breath.    Cardiovascular: Negative for chest pain and leg swelling.   Gastrointestinal: Negative for nausea, vomiting, abdominal pain and diarrhea.   Genitourinary: Negative for dysuria and hematuria.   Skin: Negative for rash.   Neurological: Negative for dizziness, focal weakness and headaches.   Endo/Heme/Allergies: Does not bruise/bleed easily.   Psychiatric/Behavioral: Negative for depression.  The patient is not nervous/anxious.      Objective:   /80 (BP Location: Left arm, Patient Position: Sitting)   Pulse 80   Temp 36.5 °C (97.7 °F) (Temporal)   Resp 16   Ht 1.702 m (5' 7\")   Wt 124 kg (274 lb)   SpO2 97%   BMI 42.91 kg/m²     Wt Readings from Last 4 Encounters:   08/05/21 124 kg (274 lb)   07/15/21 122 kg (269 lb)   12/30/19 118 kg (261 lb)   04/20/19 104 kg (230 lb)       A chaperone was offered to the patient during today's exam. Patient declined chaperone.    Physical Exam:  Constitutional: Well-developed and well-nourished. Not diaphoretic. No distress.   Skin: Skin is warm and dry. No rash noted.  Head: Atraumatic without lesions.  Eyes: Conjunctivae and extraocular motions are normal. Pupils are equal, round, and reactive to light. No scleral icterus.   Ears:  External ears unremarkable. Tympanic membranes clear and intact.  Nose: Nares patent. Septum midline. Turbinates without erythema nor edema. No discharge.   Mouth/Throat: Tongue normal. Oropharynx is clear and moist. Posterior pharynx without " erythema or exudates.  Neck: Supple, trachea midline. Normal range of motion. No thyromegaly present. No lymphadenopathy--cervical or supraclavicular.  Cardiovascular: Regular rate and rhythm, S1 and S2 without murmur, rubs, or gallops.    Respiratory: Effort normal. Clear to auscultation throughout. No adventitious sounds.   Breast: Breasts examined seated and supine. No skin changes, peau d'orange or nipple retraction. No discharge. No axillary or supraclavicular adenopathy. No masses or nodularity palpable.  Abdomen: Soft, non tender, and without distention. Active bowel sounds in all four quadrants. No rebound, guarding, masses or HSM.  : Perineum and external genitalia normal without rash. Vagina with bloody discharge. Cervix without visible lesions or discharge. Bimanual exam without adnexal masses or cervical motion tenderness.  Extremities: No cyanosis, clubbing, erythema, nor edema. Distal pulses intact and symmetric.   Musculoskeletal: All major joints AROM full in all directions without pain.  Neurological: Alert and oriented x 3. Grossly non-focal. Strength and sensation grossly intact. DTRs 2+/3 and symmetric.   Psychiatric:  Behavior, mood, and affect are appropriate.    Assessment and Plan:     1. Screening for cervical cancer  - THINPREP PAP W/HPV AND CTNG; Future  Pap obtained.  To labs.  Monitor and follow.     2. Well woman exam with routine gynecological exam  Here for well woman/pap.  Breast exam, pap obtained.  Discussed labs, elevated testosterone.  Discussed SBE, encouraged more exercise.     3. PCO (polycystic ovaries)  Chronic, ongoing, elevated testosterone, male hair distribution, obesity, irregular periods.  Referral to gyn  4. Vitamin D deficiency  Acute finding.  Encouraged 2000 units D3 daily. Monitor.     5. Mixed hyperlipidemia  Chornic, ongoing, elevated LDL and triglycerides.  Discussed dietary changes and exercise. Monitor and follow.       Health maintenance:  h   Labs per  orders  Immunizations per orders  Patient counseled about skin care, diet, supplements, and exercise.  Discussed  breast self exam     Follow-up: No follow-ups on file.

## 2021-08-05 NOTE — ASSESSMENT & PLAN NOTE
Here for well woman exam.  No breast changes, reports some hair on breasts.  Elevated testosterone levels.  No vaginal lesions, odor, pain.  + irregular bleeding.

## 2021-08-05 NOTE — ASSESSMENT & PLAN NOTE
Results for LOU TOMAS (MRN [id number]) as of 8/5/2021 15:06   Ref. Range 7/17/2021 08:33   Sex Hormone Bind Globulin Latest Ref Range: 30 - 135 nmol/L 24 (L)   Testosterone Fr LCMS Latest Ref Range: 1.3 - 9.2 pg/mL 11.2 (H)   Testosterone,Total Latest Ref Range: 9 - 55 ng/dL 57 (H)

## 2021-08-05 NOTE — ASSESSMENT & PLAN NOTE
Results for LOU TOMAS (MRN [id number]) as of 8/5/2021 15:06   Ref. Range 7/17/2021 08:33   25-Hydroxy   Vitamin D 25 Latest Ref Range: 30 - 100 ng/mL 20 (L)

## 2021-08-06 DIAGNOSIS — Z12.4 SCREENING FOR CERVICAL CANCER: ICD-10-CM

## 2021-08-09 ENCOUNTER — OFFICE VISIT (OUTPATIENT)
Dept: MEDICAL GROUP | Facility: PHYSICIAN GROUP | Age: 36
End: 2021-08-09
Payer: COMMERCIAL

## 2021-08-09 VITALS
HEART RATE: 71 BPM | HEIGHT: 67 IN | WEIGHT: 267 LBS | SYSTOLIC BLOOD PRESSURE: 122 MMHG | BODY MASS INDEX: 41.91 KG/M2 | TEMPERATURE: 96.8 F | OXYGEN SATURATION: 98 % | RESPIRATION RATE: 16 BRPM | DIASTOLIC BLOOD PRESSURE: 80 MMHG

## 2021-08-09 DIAGNOSIS — H93.8X3 EAR FULLNESS, BILATERAL: ICD-10-CM

## 2021-08-09 LAB
C TRACH DNA GENITAL QL NAA+PROBE: NEGATIVE
CYTOLOGY REG CYTOL: NORMAL
HPV HR 12 DNA CVX QL NAA+PROBE: NEGATIVE
HPV16 DNA SPEC QL NAA+PROBE: NEGATIVE
HPV18 DNA SPEC QL NAA+PROBE: NEGATIVE
N GONORRHOEA DNA GENITAL QL NAA+PROBE: NEGATIVE
SPECIMEN SOURCE: NORMAL
SPECIMEN SOURCE: NORMAL

## 2021-08-09 PROCEDURE — 99212 OFFICE O/P EST SF 10 MIN: CPT | Performed by: NURSE PRACTITIONER

## 2021-08-09 ASSESSMENT — FIBROSIS 4 INDEX: FIB4 SCORE: 0.52

## 2021-08-09 NOTE — PROGRESS NOTES
"Chief Complaint   Patient presents with   • Hearing Problem     x2 weeks, both ears, build up of ear wax       Subjective:   Selena Yee is a 36 y.o. female here today for evaluation and management of:    Ear fullness, bilateral  Reports recent decrease in hearing and fullness in ears.  No pain, discharge, blood. Past hx of cerumen impaction in the past.  On exam, both TM's are occluded with dark wax.            Current medicines (including changes today)  No current outpatient medications on file.     No current facility-administered medications for this visit.     She  has a past medical history of Anemia, Back pain (6/5/2009), Bilateral impacted cerumen (8/24/2015), DDD (degenerative disc disease), lumbar (6/5/2009), DUB (dysfunctional uterine bleeding) (6/5/2009), PCO (polycystic ovaries) (6/5/2009), and UTI (lower urinary tract infection) (8/24/2015).    ROS as stated in hpi  No chest pain, no shortness of breath, no abdominal pain       Objective:     /80 (BP Location: Left arm, Patient Position: Sitting, BP Cuff Size: Adult)   Pulse 71   Temp 36 °C (96.8 °F) (Temporal)   Resp 16   Ht 1.702 m (5' 7\")   Wt 121 kg (267 lb)   SpO2 98%  Body mass index is 41.82 kg/m².   Physical Exam:  Constitutional: Alert, no distress.  Skin: Warm, dry, good turgor,no cyanosis, no rashes in visible areas.  Eye: Equal, round and reactive, conjunctiva clear, lids normal.  Ears: No tenderness, no discharge.Bilateral TM's occluded by Wax.  After flushing and removal of cerumen, TM's are intact with good light relfex.   Psych: Alert and oriented x3, normal affect and mood and judgement.        Assessment and Plan:   The following treatment plan was discussed    1. Ear fullness, bilateral  Acute issue, non complicated. Cerumen impaction.  Both ears were flushed and remaining cerumen removed with lighted currette by me.  Advised trying debrox.  Monitor and follow.       Followup: No follow-ups on file.     "     Educated in proper administration of medication(s) ordered today including safety, possible SE, risks, benefits, rationale and alternatives to therapy.     Please note that this dictation was created using voice recognition software. I have made every reasonable attempt to correct obvious errors, but I expect that there are errors of grammar and possibly content that I did not discover before finalizing the note.

## 2021-08-09 NOTE — ASSESSMENT & PLAN NOTE
Reports recent decrease in hearing and fullness in ears.  No pain, discharge, blood. Past hx of cerumen impaction in the past.  On exam, both TM's are occluded with dark wax.

## 2022-06-19 ENCOUNTER — OFFICE VISIT (OUTPATIENT)
Dept: URGENT CARE | Facility: PHYSICIAN GROUP | Age: 37
End: 2022-06-19
Payer: COMMERCIAL

## 2022-06-19 ENCOUNTER — HOSPITAL ENCOUNTER (OUTPATIENT)
Facility: MEDICAL CENTER | Age: 37
End: 2022-06-19
Attending: PHYSICIAN ASSISTANT
Payer: COMMERCIAL

## 2022-06-19 VITALS
HEART RATE: 65 BPM | TEMPERATURE: 98 F | RESPIRATION RATE: 16 BRPM | HEIGHT: 67 IN | WEIGHT: 250 LBS | BODY MASS INDEX: 39.24 KG/M2 | SYSTOLIC BLOOD PRESSURE: 130 MMHG | DIASTOLIC BLOOD PRESSURE: 88 MMHG | OXYGEN SATURATION: 98 %

## 2022-06-19 DIAGNOSIS — J06.9 VIRAL UPPER RESPIRATORY TRACT INFECTION: ICD-10-CM

## 2022-06-19 DIAGNOSIS — A09 TRAVELER'S DIARRHEA: ICD-10-CM

## 2022-06-19 DIAGNOSIS — R05.9 COUGH: ICD-10-CM

## 2022-06-19 DIAGNOSIS — J02.9 SORE THROAT: ICD-10-CM

## 2022-06-19 LAB — COVID ORDER STATUS COVID19: NORMAL

## 2022-06-19 PROCEDURE — U0003 INFECTIOUS AGENT DETECTION BY NUCLEIC ACID (DNA OR RNA); SEVERE ACUTE RESPIRATORY SYNDROME CORONAVIRUS 2 (SARS-COV-2) (CORONAVIRUS DISEASE [COVID-19]), AMPLIFIED PROBE TECHNIQUE, MAKING USE OF HIGH THROUGHPUT TECHNOLOGIES AS DESCRIBED BY CMS-2020-01-R: HCPCS

## 2022-06-19 PROCEDURE — U0005 INFEC AGEN DETEC AMPLI PROBE: HCPCS

## 2022-06-19 PROCEDURE — 99213 OFFICE O/P EST LOW 20 MIN: CPT | Performed by: PHYSICIAN ASSISTANT

## 2022-06-19 ASSESSMENT — FIBROSIS 4 INDEX: FIB4 SCORE: 0.54

## 2022-06-19 NOTE — PROGRESS NOTES
"Traveler's diarrhea subjective:   Selena Yee is a 37 y.o. female who presents for Coronavirus Screening (Diarrhea, cough x 7 days)       Patient returned from [place] on 6/13; developed diarrhea Monday that went on through Wednesday it was very watery and voluminous in nature.  She has been taking peptobismol, pedialyte.  The diarrhea improved somewhat and is more formed now.  She is tolerating liquids.  No bloody or tarry stools.  No vomiting.  Did have significant fatigue over the week.  Mild epigastric pain.    She has also developed a sore throat and cough Wednesday.  No fevers. No SOB.   also sick; neither have been tested.  Fully vaccinated and boosted.  Requesting covid testing.    Medications:  This patient does not have an active medication from one of the medication groupers.    Allergies:             Patient has no known allergies.    Surgical History:       No past surgical history on file.    Past Social Hx:  Selena Yee  reports that she has never smoked. She has never used smokeless tobacco. She reports current alcohol use. She reports that she does not use drugs.     Past Family Hx:   Selena Yee family history includes Diabetes in her father and mother; Hypertension in her father.       Problem list, medications, and allergies reviewed by myself today in Epic.     Objective:     /88 (BP Location: Left arm, Patient Position: Sitting, BP Cuff Size: Adult)   Pulse 65   Temp 36.7 °C (98 °F) (Temporal)   Resp 16   Ht 1.702 m (5' 7\")   Wt 113 kg (250 lb)   SpO2 98%   BMI 39.16 kg/m²     Physical Exam  Vitals and nursing note reviewed.   Constitutional:       General: She is not in acute distress.     Appearance: Normal appearance. She is not ill-appearing.   HENT:      Head: Normocephalic.      Jaw: No trismus.      Right Ear: External ear normal. No drainage. A middle ear effusion is present. No mastoid tenderness. Tympanic membrane is not " erythematous or bulging.      Left Ear: External ear normal. No drainage. A middle ear effusion is present. No mastoid tenderness. Tympanic membrane is not erythematous or bulging.      Nose: Congestion and rhinorrhea present.      Right Turbinates: Enlarged and swollen.      Left Turbinates: Enlarged and swollen.      Mouth/Throat:      Mouth: Mucous membranes are moist.      Tongue: No lesions. Tongue does not deviate from midline.      Palate: No lesions.      Pharynx: Uvula midline. Posterior oropharyngeal erythema present. No oropharyngeal exudate or uvula swelling.      Tonsils: No tonsillar exudate or tonsillar abscesses.   Eyes:      General:         Right eye: No discharge.         Left eye: No discharge.      Extraocular Movements: Extraocular movements intact.   Cardiovascular:      Rate and Rhythm: Normal rate and regular rhythm.      Pulses: Normal pulses.      Heart sounds: Normal heart sounds.   Pulmonary:      Effort: Pulmonary effort is normal. No accessory muscle usage or respiratory distress.      Breath sounds: Normal breath sounds and air entry. No stridor or decreased air movement. No wheezing, rhonchi or rales.   Abdominal:      General: Bowel sounds are normal. There is no distension.      Palpations: Abdomen is soft.      Tenderness: There is generalized abdominal tenderness and tenderness in the epigastric area. There is no right CVA tenderness, left CVA tenderness, guarding or rebound. Negative signs include Kessler's sign.      Comments: Abdomen is soft.  There is no guarding or rebound.  Mild discomfort that is generalized but more prevalent in the epigastric region.   Musculoskeletal:      Cervical back: Normal range of motion.   Lymphadenopathy:      Head:      Right side of head: No tonsillar adenopathy.      Left side of head: No tonsillar adenopathy.      Cervical: No cervical adenopathy.   Skin:     General: Skin is warm.      Findings: No rash.   Neurological:      Mental Status:  She is alert.   Psychiatric:         Behavior: Behavior is cooperative.         Assessment/Plan:     Diagnosis and Associated Orders:     1. Viral upper respiratory tract infection  - SARS-CoV-2 PCR (24 hour In-House): Collect NP swab in VTM; Future    2. Traveler's diarrhea    3. Cough    4. Sore throat        Comments/MDM:  Patient recently returned from [place].  Her symptoms appear to be self resolving the likely had travelers diarrhea.  She is no longer having watery stools.  Has no bloody or tarry stools.  No fever or chills.  Patient will contact me if her diarrhea persist at which point we could consider antibiotic treatment but I believe she is resolving    -Increase fluids.  -Rest.  -Advance diet as tolerated starting with bland, low fat meals. Boiled starches and cereals with salt are indicated in patients with watery diarrhea; crackers, bananas, soup, and boiled vegetables may also be consumed.   -Medication as prescribed.  -Probiotics  -Follow up with PCP or clinic in 3 days if not feeling better.  -Advised light diet for next 24 hours then increase as tolerated, push po fluids.  Start pepto bismol 2 caps q 30-60 min, max 16 tab/24 hours.  Advised of darkening of stools and mouth with this product.      The patient's presenting symptoms and exam findings most likely are due to a viral etiology.     Symptomatic and supportive care:   Plenty of oral hydration and rest   Over the counter cough suppressant as directed.  Tylenol or ibuprofen for pain and fever as directed.   Warm salt water gargles for sore throat, soft foods, cool liquids.   Saline nasal spray, Flonase, and/or otc sudafed (if no history of hypertension) as a decongestant.   Infection control measures at home. Stay away from people, Hand washing, covering sneeze/cough, disinfect surfaces.   Remain home from work, school, and other populated environments while ill.  Overall, the patient is well-appearing. They are not hypoxic, afebrile, and a  normal pulmonary exam.    Test for COVID-19 performed if applicable. Result will be reviewed by myself. We will call/message back for positive results only and appropriate further instructions. Instructed to sign up for [employer] if they have not already. Result will be automatically released to [employer] application for patient review.       -Follow up emergently for more than 6 watery stools in a 24 hour period, blood or mucus in stool, fever greater than 101.2, inability to tolerated fluids, signs of dehydration, weakness, elevated heart rate, increased or persistent abdominal pain.      I personally reviewed prior external notes and test results pertinent to today's visit.  Red flags discussed as well as indications to present to the Emergency Department.  Supportive care, natural history, differential diagnoses, and indications for immediate follow-up discussed.  Patient expresses understanding and agrees to plan.  Patient denies any other questions or concerns.    Follow-up with the primary care physician for recheck, reevaluation, and consideration of further management.      Please note that this dictation was created using voice recognition software. I have made a reasonable attempt to correct obvious errors, but I expect that there are errors of grammar and possibly content that I did not discover before finalizing the note.    This note was electronically signed by Kimberly Sanchez PA-C

## 2022-06-20 LAB
SARS-COV-2 RNA RESP QL NAA+PROBE: NOTDETECTED
SPECIMEN SOURCE: NORMAL

## 2022-10-08 ENCOUNTER — OFFICE VISIT (OUTPATIENT)
Dept: URGENT CARE | Facility: PHYSICIAN GROUP | Age: 37
End: 2022-10-08
Payer: COMMERCIAL

## 2022-10-08 VITALS
SYSTOLIC BLOOD PRESSURE: 110 MMHG | DIASTOLIC BLOOD PRESSURE: 72 MMHG | TEMPERATURE: 96.7 F | RESPIRATION RATE: 20 BRPM | WEIGHT: 240 LBS | HEIGHT: 67 IN | BODY MASS INDEX: 37.67 KG/M2 | OXYGEN SATURATION: 96 % | HEART RATE: 87 BPM

## 2022-10-08 DIAGNOSIS — H93.8X3 EAR FULLNESS, BILATERAL: ICD-10-CM

## 2022-10-08 DIAGNOSIS — H69.93 EUSTACHIAN TUBE DYSFUNCTION, BILATERAL: ICD-10-CM

## 2022-10-08 PROCEDURE — 99213 OFFICE O/P EST LOW 20 MIN: CPT | Performed by: NURSE PRACTITIONER

## 2022-10-08 ASSESSMENT — ENCOUNTER SYMPTOMS
DIZZINESS: 0
SHORTNESS OF BREATH: 0
EYE DISCHARGE: 0
VOMITING: 0
NAUSEA: 0
ABDOMINAL PAIN: 0
WEAKNESS: 0
NECK PAIN: 0
CHILLS: 0
WHEEZING: 0
FEVER: 0
SORE THROAT: 0
EYE REDNESS: 0
MYALGIAS: 0
DIARRHEA: 0
SINUS PAIN: 0
HEADACHES: 0
COUGH: 0
CONSTIPATION: 0

## 2022-10-08 ASSESSMENT — FIBROSIS 4 INDEX: FIB4 SCORE: 0.54

## 2022-10-08 ASSESSMENT — PAIN SCALES - GENERAL: PAINLEVEL: NO PAIN

## 2022-10-08 NOTE — PROGRESS NOTES
Subjective     Selena Yee is a 37 y.o. female who presents with Ear Fullness (Both Ears)            Ear Fullness  Pertinent negatives include no abdominal pain, chills, congestion, coughing, fever, headaches, myalgias, nausea, neck pain, rash, sore throat, vomiting or weakness. States has been experiencing bilateral ear fullness, right greater than left x2 days.  States recent moving and exposure to new cats.  Denies history of seasonal allergies.  States did have some mild nasal congestion but this has improved.  Denies ear pain or discharge.  Does have history of cerumen impaction of ears.  States does have narrow ear canals.  No over-the-counter medications used for symptoms at this time.    PMH:  has a past medical history of Anemia, Back pain (6/5/2009), Bilateral impacted cerumen (8/24/2015), DDD (degenerative disc disease), lumbar (6/5/2009), DUB (dysfunctional uterine bleeding) (6/5/2009), PCO (polycystic ovaries) (6/5/2009), and UTI (lower urinary tract infection) (8/24/2015).  MEDS: No current outpatient medications on file.  ALLERGIES: No Known Allergies  SURGHX: History reviewed. No pertinent surgical history.  SOCHX:  reports that she has never smoked. She has never used smokeless tobacco. She reports current alcohol use. She reports that she does not use drugs.  FH: Family history was reviewed, no pertinent findings to report      Review of Systems   Constitutional:  Negative for chills, fever and malaise/fatigue.   HENT:  Positive for hearing loss. Negative for congestion, ear discharge, ear pain, sinus pain, sore throat and tinnitus.    Eyes:  Negative for discharge and redness.   Respiratory:  Negative for cough, shortness of breath and wheezing.    Gastrointestinal:  Negative for abdominal pain, constipation, diarrhea, nausea and vomiting.   Musculoskeletal:  Negative for myalgias and neck pain.   Skin:  Negative for itching and rash.   Neurological:  Negative for dizziness, weakness  "and headaches.   Endo/Heme/Allergies:  Negative for environmental allergies.   All other systems reviewed and are negative.           Objective     /72 (BP Location: Left arm, Patient Position: Sitting, BP Cuff Size: Adult)   Pulse 87   Temp 35.9 °C (96.7 °F) (Temporal)   Resp 20   Ht 1.702 m (5' 7\")   Wt 109 kg (240 lb)   SpO2 96%   BMI 37.59 kg/m²      Physical Exam  Vitals reviewed.   Constitutional:       General: She is awake. She is not in acute distress.     Appearance: Normal appearance. She is well-developed. She is not ill-appearing, toxic-appearing or diaphoretic.   HENT:      Head: Normocephalic.      Right Ear: Ear canal and external ear normal. A middle ear effusion is present. There is no impacted cerumen.      Left Ear: Ear canal and external ear normal. A middle ear effusion is present. There is no impacted cerumen.      Nose: Congestion present.   Eyes:      Conjunctiva/sclera: Conjunctivae normal.      Pupils: Pupils are equal, round, and reactive to light.   Cardiovascular:      Rate and Rhythm: Normal rate.   Pulmonary:      Effort: Pulmonary effort is normal.   Musculoskeletal:         General: Normal range of motion.      Cervical back: Normal range of motion and neck supple.   Skin:     General: Skin is warm and dry.   Neurological:      Mental Status: She is alert and oriented to person, place, and time.   Psychiatric:         Attention and Perception: Attention normal.         Mood and Affect: Mood normal.         Speech: Speech normal.         Behavior: Behavior normal. Behavior is cooperative.                           Assessment & Plan        1. Ear fullness, bilateral      2. Eustachian tube dysfunction, bilateral    -Increase water intake  -Get rest  -May use Ibuprofen/Tylenol as needed for any fever, body aches or throat pain  -May take longer acting antihistamine for seasonal allergy symptoms (chose one like Claritin/Zyrtec/Allegra without decongestant) x 1 week then as " needed   -May use over the counter saline nasal spray for nasal congestion up to 4x/day  -May use over the counter Flonase or Nasocort for allergen nasal congestion as needed x 1 week then as needed   -May gargle with salt water as needed for throat discomfort up to 4x/day (1 tsp salt in one cup warm water)  -Monitor for worsening or persistent symptoms, increased facial pressure, dizziness, fever, productive cough, shortness of breath and chest pain/tightness- need re-evaluation